# Patient Record
Sex: MALE | Race: BLACK OR AFRICAN AMERICAN | NOT HISPANIC OR LATINO | Employment: FULL TIME | URBAN - METROPOLITAN AREA
[De-identification: names, ages, dates, MRNs, and addresses within clinical notes are randomized per-mention and may not be internally consistent; named-entity substitution may affect disease eponyms.]

---

## 2023-08-08 ENCOUNTER — OFFICE VISIT (OUTPATIENT)
Dept: PRIMARY CARE | Facility: CLINIC | Age: 33
End: 2023-08-08
Payer: COMMERCIAL

## 2023-08-08 VITALS
RESPIRATION RATE: 18 BRPM | TEMPERATURE: 97.7 F | OXYGEN SATURATION: 98 % | DIASTOLIC BLOOD PRESSURE: 82 MMHG | HEIGHT: 73 IN | BODY MASS INDEX: 31.94 KG/M2 | SYSTOLIC BLOOD PRESSURE: 126 MMHG | WEIGHT: 241 LBS | HEART RATE: 70 BPM

## 2023-08-08 DIAGNOSIS — R06.83 LOUD SNORING: ICD-10-CM

## 2023-08-08 DIAGNOSIS — K14.8 LARGE TONGUE: ICD-10-CM

## 2023-08-08 DIAGNOSIS — R00.2 HEART PALPITATIONS: ICD-10-CM

## 2023-08-08 DIAGNOSIS — Z00.00 HEALTH CARE MAINTENANCE: Primary | ICD-10-CM

## 2023-08-08 DIAGNOSIS — R20.2 PARESTHESIA: ICD-10-CM

## 2023-08-08 DIAGNOSIS — G47.19 EXCESSIVE DAYTIME SLEEPINESS: ICD-10-CM

## 2023-08-08 DIAGNOSIS — I49.8 SINUS ARRHYTHMIA SEEN ON ELECTROCARDIOGRAM: ICD-10-CM

## 2023-08-08 PROBLEM — M54.12 CERVICAL RADICULOPATHY: Status: ACTIVE | Noted: 2023-08-08

## 2023-08-08 PROCEDURE — 90715 TDAP VACCINE 7 YRS/> IM: CPT | Performed by: FAMILY MEDICINE

## 2023-08-08 PROCEDURE — 90471 IMMUNIZATION ADMIN: CPT | Performed by: FAMILY MEDICINE

## 2023-08-08 PROCEDURE — 1036F TOBACCO NON-USER: CPT | Performed by: FAMILY MEDICINE

## 2023-08-08 PROCEDURE — 93000 ELECTROCARDIOGRAM COMPLETE: CPT | Performed by: FAMILY MEDICINE

## 2023-08-08 PROCEDURE — 99395 PREV VISIT EST AGE 18-39: CPT | Performed by: FAMILY MEDICINE

## 2023-08-08 ASSESSMENT — ENCOUNTER SYMPTOMS
HEADACHES: 0
OCCASIONAL FEELINGS OF UNSTEADINESS: 0
DEPRESSION: 0
SHORTNESS OF BREATH: 0
LOSS OF SENSATION IN FEET: 0

## 2023-08-08 ASSESSMENT — PATIENT HEALTH QUESTIONNAIRE - PHQ9
1. LITTLE INTEREST OR PLEASURE IN DOING THINGS: NOT AT ALL
2. FEELING DOWN, DEPRESSED OR HOPELESS: SEVERAL DAYS
10. IF YOU CHECKED OFF ANY PROBLEMS, HOW DIFFICULT HAVE THESE PROBLEMS MADE IT FOR YOU TO DO YOUR WORK, TAKE CARE OF THINGS AT HOME, OR GET ALONG WITH OTHER PEOPLE: SOMEWHAT DIFFICULT
SUM OF ALL RESPONSES TO PHQ9 QUESTIONS 1 AND 2: 1

## 2023-08-08 NOTE — PROGRESS NOTES
"Subjective     Didier River is a 32 y.o. male who presents for Annual Exam.    HPI     Pt is new to me.    He is here for well exam and to establish care.     He sees  neurology for hx of cervical radiculopathy, paresthesia.      He does have snoring and daytime sleepiness.  He does report having a sleep study remotely which as normal but he does admit to some weight gain since last time he had it checked.     Review of Systems   Respiratory:  Negative for shortness of breath.    Cardiovascular:  Negative for chest pain.   Neurological:  Negative for headaches.       Objective     Vitals:    08/08/23 0907   BP: 126/82   BP Location: Left arm   Patient Position: Sitting   Pulse: 70   Resp: 18   Temp: 36.5 °C (97.7 °F)   TempSrc: Temporal   SpO2: 98%   Weight: 109 kg (241 lb)   Height: 1.854 m (6' 1\")        No current outpatient medications     Past Surgical History:   Procedure Laterality Date    WISDOM TOOTH EXTRACTION          Social History     Tobacco Use    Smoking status: Never    Smokeless tobacco: Never   Vaping Use    Vaping Use: Never used   Substance Use Topics    Alcohol use: Not Currently    Drug use: Never        Family History   Problem Relation Name Age of Onset    Sarcoidosis Mother      Autism spectrum disorder Brother          Immunization History   Administered Date(s) Administered    Moderna SARS-CoV-2 Vaccination 04/15/2021, 05/13/2021, 02/07/2022    Tdap vaccine, age 10 years and older (BOOSTRIX) 08/08/2023        Physical Exam  Vitals reviewed.   Constitutional:       General: He is not in acute distress.     Appearance: Normal appearance. He is obese.   HENT:      Head: Normocephalic and atraumatic.      Right Ear: Tympanic membrane, ear canal and external ear normal.      Left Ear: Tympanic membrane, ear canal and external ear normal.      Nose: Nose normal.      Mouth/Throat:      Mouth: Mucous membranes are moist.      Pharynx: Oropharynx is clear. No oropharyngeal exudate or posterior " oropharyngeal erythema.      Comments: Large tongue  Eyes:      Extraocular Movements: Extraocular movements intact.      Pupils: Pupils are equal, round, and reactive to light.   Neck:      Thyroid: No thyroid mass or thyromegaly.   Cardiovascular:      Rate and Rhythm: Normal rate and regular rhythm.      Heart sounds: No murmur heard.  Pulmonary:      Effort: Pulmonary effort is normal. No respiratory distress.      Breath sounds: Normal breath sounds. No wheezing, rhonchi or rales.   Abdominal:      General: Abdomen is flat. There is no distension.      Palpations: Abdomen is soft.      Tenderness: There is no abdominal tenderness.   Genitourinary:     Testes: Normal.   Musculoskeletal:         General: Normal range of motion.   Lymphadenopathy:      Cervical: No cervical adenopathy.   Skin:     General: Skin is warm and dry.      Findings: No rash.   Neurological:      General: No focal deficit present.      Mental Status: He is alert and oriented to person, place, and time. Mental status is at baseline.   Psychiatric:         Mood and Affect: Mood normal.         Behavior: Behavior normal.         Problem List Items Addressed This Visit       Paresthesia    Relevant Orders    Vitamin B12     Other Visit Diagnoses       Health care maintenance    -  Primary    Relevant Orders    ECG 12 lead (Completed)    Comprehensive Metabolic Panel    Lipid Panel    CBC and Auto Differential    Hemoglobin A1C    Tdap vaccine, age 10 years and older (BOOSTRIX) (Completed)    Heart palpitations        Relevant Orders    Referral to Cardiology    TSH with reflex to Free T4 if abnormal    Sinus arrhythmia seen on electrocardiogram        Relevant Orders    Referral to Cardiology    Excessive daytime sleepiness        Relevant Orders    Home sleep apnea test (HSAT)    Loud snoring        Relevant Orders    Home sleep apnea test (HSAT)    Large tongue        Relevant Orders    Home sleep apnea test (HSAT)             Assessment/Plan     Well Exam - new patient    Vaccines - tdap given today    I recommend yearly flu vaccine and routine COVID vaccinations as indicated     Complete labs    Healthy diet, routine exercise was discussed with patient      Hx of cervical radiculopathy, paresthesia - sees  neurology    Heart palpitations symptoms - EKG today shows sinus rhythm with sinus arrhythmia - referred to cardio    Daytime sleepiness, loud snoring, obese - home sleep study ordered     Obesity - bmi 31, discussed trying to lose weight, healthy diet, exercise.    Follow up in 3 months or sooner if needed

## 2023-08-24 LAB
ANION GAP IN SER/PLAS: 12 MMOL/L (ref 10–20)
CALCIUM (MG/DL) IN SER/PLAS: 9.6 MG/DL (ref 8.6–10.3)
CARBON DIOXIDE, TOTAL (MMOL/L) IN SER/PLAS: 24 MMOL/L (ref 21–32)
CHLORIDE (MMOL/L) IN SER/PLAS: 106 MMOL/L (ref 98–107)
CREATININE (MG/DL) IN SER/PLAS: 0.9 MG/DL (ref 0.5–1.3)
ERYTHROCYTE DISTRIBUTION WIDTH (RATIO) BY AUTOMATED COUNT: 13.4 % (ref 11.5–14.5)
ERYTHROCYTE MEAN CORPUSCULAR HEMOGLOBIN CONCENTRATION (G/DL) BY AUTOMATED: 32.6 G/DL (ref 32–36)
ERYTHROCYTE MEAN CORPUSCULAR VOLUME (FL) BY AUTOMATED COUNT: 85 FL (ref 80–100)
ERYTHROCYTES (10*6/UL) IN BLOOD BY AUTOMATED COUNT: 4.83 X10E12/L (ref 4.5–5.9)
GFR MALE: >90 ML/MIN/1.73M2
GLUCOSE (MG/DL) IN SER/PLAS: 72 MG/DL (ref 74–99)
HEMATOCRIT (%) IN BLOOD BY AUTOMATED COUNT: 41.1 % (ref 41–52)
HEMOGLOBIN (G/DL) IN BLOOD: 13.4 G/DL (ref 13.5–17.5)
LEUKOCYTES (10*3/UL) IN BLOOD BY AUTOMATED COUNT: 6.8 X10E9/L (ref 4.4–11.3)
MAGNESIUM (MG/DL) IN SER/PLAS: 2.01 MG/DL (ref 1.6–2.4)
NRBC (PER 100 WBCS) BY AUTOMATED COUNT: 0 /100 WBC (ref 0–0)
PLATELETS (10*3/UL) IN BLOOD AUTOMATED COUNT: 252 X10E9/L (ref 150–450)
POTASSIUM (MMOL/L) IN SER/PLAS: 3.6 MMOL/L (ref 3.5–5.3)
SODIUM (MMOL/L) IN SER/PLAS: 138 MMOL/L (ref 136–145)
THYROTROPIN (MIU/L) IN SER/PLAS BY DETECTION LIMIT <= 0.05 MIU/L: 0.62 MIU/L (ref 0.44–3.98)
UREA NITROGEN (MG/DL) IN SER/PLAS: 12 MG/DL (ref 6–23)

## 2023-09-25 DIAGNOSIS — R06.83 LOUD SNORING: ICD-10-CM

## 2023-09-25 DIAGNOSIS — K14.8 LARGE TONGUE: ICD-10-CM

## 2023-09-25 DIAGNOSIS — G47.19 EXCESSIVE DAYTIME SLEEPINESS: ICD-10-CM

## 2023-09-26 PROBLEM — G47.33 OBSTRUCTIVE SLEEP APNEA: Status: ACTIVE | Noted: 2023-09-26

## 2023-10-05 ENCOUNTER — OFFICE VISIT (OUTPATIENT)
Dept: PRIMARY CARE | Facility: CLINIC | Age: 33
End: 2023-10-05
Payer: COMMERCIAL

## 2023-10-05 VITALS
HEART RATE: 66 BPM | DIASTOLIC BLOOD PRESSURE: 68 MMHG | TEMPERATURE: 97.6 F | BODY MASS INDEX: 31.94 KG/M2 | OXYGEN SATURATION: 98 % | SYSTOLIC BLOOD PRESSURE: 115 MMHG | RESPIRATION RATE: 18 BRPM | WEIGHT: 241 LBS | HEIGHT: 73 IN

## 2023-10-05 DIAGNOSIS — G47.33 OBSTRUCTIVE SLEEP APNEA: Primary | ICD-10-CM

## 2023-10-05 DIAGNOSIS — F32.A ANXIETY AND DEPRESSION: ICD-10-CM

## 2023-10-05 DIAGNOSIS — F41.9 ANXIETY AND DEPRESSION: ICD-10-CM

## 2023-10-05 PROCEDURE — 1036F TOBACCO NON-USER: CPT | Performed by: FAMILY MEDICINE

## 2023-10-05 PROCEDURE — 99214 OFFICE O/P EST MOD 30 MIN: CPT | Performed by: FAMILY MEDICINE

## 2023-10-05 RX ORDER — CITALOPRAM 10 MG/1
2 TABLET ORAL DAILY
COMMUNITY
Start: 2023-08-15

## 2023-10-05 RX ORDER — MULTIVITAMIN
TABLET ORAL
COMMUNITY
Start: 2023-09-18

## 2023-10-05 RX ORDER — CITALOPRAM 20 MG/1
30 TABLET, FILM COATED ORAL DAILY
COMMUNITY
Start: 2023-09-14

## 2023-10-05 ASSESSMENT — ENCOUNTER SYMPTOMS
SHORTNESS OF BREATH: 0
HEADACHES: 0

## 2023-10-05 NOTE — PROGRESS NOTES
"Subjective     Didier River is a 32 y.o. male who presents for Sleep Apnea.    HPI     Pt is here to discuss his recent home sleep study through  which showed mild ENID.  He continues to have daytime sleepiness, snoring, some nighttime awakenings.  No morning headaches or choking or gasping.  He is interested in getting started on CPAP therapy.  He has an 18 inch neck size.      He stopped drinking caffeine about one month ago.      Review of Systems   Respiratory:  Negative for shortness of breath.    Cardiovascular:  Negative for chest pain.   Neurological:  Negative for headaches.       Objective     Vitals:    10/05/23 1546   BP: 115/68   BP Location: Left arm   Patient Position: Sitting   Pulse: 66   Resp: 18   Temp: 36.4 °C (97.6 °F)   TempSrc: Temporal   SpO2: 98%   Weight: 109 kg (241 lb)   Height: 1.854 m (6' 1\")        Current Outpatient Medications   Medication Instructions    citalopram (CeleXA) 10 mg tablet 2 tablets, oral, Daily    citalopram (CELEXA) 30 mg, oral, Daily, Take 1 and 1/2 (one and one-half) tablets by mouth daily.    multivitamin tablet oral        Past Surgical History:   Procedure Laterality Date    WISDOM TOOTH EXTRACTION          Social History     Tobacco Use    Smoking status: Never    Smokeless tobacco: Never   Vaping Use    Vaping Use: Never used   Substance Use Topics    Alcohol use: Not Currently    Drug use: Never        Family History   Problem Relation Name Age of Onset    Sarcoidosis Mother      Autism spectrum disorder Brother          Immunization History   Administered Date(s) Administered    Moderna SARS-CoV-2 Vaccination 04/15/2021, 05/13/2021, 02/07/2022    Tdap vaccine, age 7 year and older (BOOSTRIX) 08/08/2023        Physical Exam  Vitals reviewed.   Constitutional:       General: He is not in acute distress.     Appearance: Normal appearance. He is well-developed. He is obese.   HENT:      Head: Normocephalic and atraumatic.      Mouth/Throat:      Pharynx: No " Status post laparoscopic right inguinal hernia repair    Subjective:  Doing well. Bowel function normal. Pain resolved. Denies nausea or vomiting. The patient had no seroma or hematoma postoperatively.      Objective:    Vitals:    05/18/17 1430   BP: 110/70   Pulse: 74   Resp: 16   Temp: 98.3 °F (36.8 °C)       Exam:  General: alert and oriented, no acute distress  Pulmonary: clear to auscultation bilaterally  Cardiac: regular rate and rhythm  Abdomen: soft, non-distended, non-tender  Incisions: clean and dry, there is no hernia present    Assessment:    s/p laparoscopic right inguinal hernia repair    Plan:  1. Resume activity as tolerated. Return as needed.       oropharyngeal exudate or posterior oropharyngeal erythema.      Comments: Large tongue, small airway noted.   Eyes:      General: Lids are normal.      Conjunctiva/sclera:      Right eye: Right conjunctiva is not injected.      Left eye: Left conjunctiva is not injected.   Neck:      Comments: 18 inch neck size   Cardiovascular:      Rate and Rhythm: Normal rate and regular rhythm.      Heart sounds: No murmur heard.  Pulmonary:      Effort: Pulmonary effort is normal. No respiratory distress.      Breath sounds: Normal breath sounds. No wheezing, rhonchi or rales.   Skin:     General: Skin is warm and dry.      Findings: No rash.   Neurological:      Mental Status: He is alert and oriented to person, place, and time. Mental status is at baseline.   Psychiatric:         Mood and Affect: Mood normal.         Behavior: Behavior normal.         Problem List Items Addressed This Visit       Obstructive sleep apnea - Primary    Relevant Orders    Positive Airway Pressure (PAP) Therapy    Anxiety and depression       Assessment/Plan     ENID - mild - recently diagnosed 9/2023 via  home sleep study.  Auto cpap ordered, follow up with me 4-6 weeks after you start cpap therapy.      Depression - sees psychiatry, on celexa 30 mg daily    Follow up after 1 month of CPAP use.

## 2023-11-09 ENCOUNTER — TELEPHONE (OUTPATIENT)
Dept: PRIMARY CARE | Facility: CLINIC | Age: 33
End: 2023-11-09
Payer: COMMERCIAL

## 2023-11-09 NOTE — TELEPHONE ENCOUNTER
Pt called and stated that the C-PAP machine that he was suppose to hear about he has not heard anything.  He said that Dr. VALDOVINOS was going to put in an order when he was here on 10/5/23 and said he would hear from them in about a week.  He would like to know what step he should take next.  Please advise.

## 2023-11-30 ENCOUNTER — TELEPHONE (OUTPATIENT)
Dept: PRIMARY CARE | Facility: CLINIC | Age: 33
End: 2023-11-30
Payer: COMMERCIAL

## 2023-11-30 NOTE — TELEPHONE ENCOUNTER
Medical services company called and needs office notes prior to the sleep study.  They have the notes from 10/5/23 they need notes prior to 9/6/23.   Please fax [phone]

## 2024-01-02 ENCOUNTER — APPOINTMENT (OUTPATIENT)
Dept: PRIMARY CARE | Facility: CLINIC | Age: 34
End: 2024-01-02
Payer: COMMERCIAL

## 2024-01-19 ENCOUNTER — TELEPHONE (OUTPATIENT)
Dept: PRIMARY CARE | Facility: CLINIC | Age: 34
End: 2024-01-19
Payer: COMMERCIAL

## 2024-01-22 ENCOUNTER — OFFICE VISIT (OUTPATIENT)
Dept: PRIMARY CARE | Facility: CLINIC | Age: 34
End: 2024-01-22
Payer: COMMERCIAL

## 2024-01-22 VITALS
DIASTOLIC BLOOD PRESSURE: 79 MMHG | BODY MASS INDEX: 33.13 KG/M2 | OXYGEN SATURATION: 98 % | HEIGHT: 73 IN | SYSTOLIC BLOOD PRESSURE: 128 MMHG | RESPIRATION RATE: 18 BRPM | HEART RATE: 76 BPM | WEIGHT: 250 LBS | TEMPERATURE: 97.6 F

## 2024-01-22 DIAGNOSIS — G47.33 OBSTRUCTIVE SLEEP APNEA: ICD-10-CM

## 2024-01-22 DIAGNOSIS — L81.9 HYPERPIGMENTED SKIN LESION: Primary | ICD-10-CM

## 2024-01-22 PROCEDURE — 1036F TOBACCO NON-USER: CPT | Performed by: FAMILY MEDICINE

## 2024-01-22 PROCEDURE — 99213 OFFICE O/P EST LOW 20 MIN: CPT | Performed by: FAMILY MEDICINE

## 2024-01-22 ASSESSMENT — ENCOUNTER SYMPTOMS
SHORTNESS OF BREATH: 0
HEADACHES: 0

## 2024-01-22 NOTE — PROGRESS NOTES
"Subjective     Didier River is a 33 y.o. male who presents for Skin Problem.    HPI     Pt has noticed a few new moles on right hip and left hip a few months ago.  No change in size or shape.  Dark brown/black moles.  No family hx of skin cancer.     Review of Systems   Respiratory:  Negative for shortness of breath.    Cardiovascular:  Negative for chest pain.   Neurological:  Negative for headaches.       Objective     Vitals:    01/22/24 1120   BP: 128/79   BP Location: Left arm   Patient Position: Sitting   Pulse: 76   Resp: 18   Temp: 36.4 °C (97.6 °F)   TempSrc: Temporal   SpO2: 98%   Weight: 113 kg (250 lb)   Height: 1.854 m (6' 1\")        Current Outpatient Medications   Medication Instructions    citalopram (CeleXA) 10 mg tablet 2 tablets, oral, Daily    citalopram (CELEXA) 30 mg, oral, Daily, Take 1 and 1/2 (one and one-half) tablets by mouth daily.    multivitamin tablet oral        Past Surgical History:   Procedure Laterality Date    WISDOM TOOTH EXTRACTION          Social History     Tobacco Use    Smoking status: Never    Smokeless tobacco: Never   Vaping Use    Vaping Use: Never used   Substance Use Topics    Alcohol use: Not Currently    Drug use: Never        Family History   Problem Relation Name Age of Onset    Sarcoidosis Mother      Autism spectrum disorder Brother          Immunization History   Administered Date(s) Administered    Moderna SARS-CoV-2 Vaccination 04/15/2021, 05/13/2021, 02/07/2022    Tdap vaccine, age 7 year and older (BOOSTRIX) 08/08/2023        Physical Exam  Vitals reviewed.   Constitutional:       General: He is not in acute distress.     Appearance: Normal appearance. He is well-developed.   HENT:      Head: Normocephalic and atraumatic.   Eyes:      General: Lids are normal.      Conjunctiva/sclera:      Right eye: Right conjunctiva is not injected.      Left eye: Left conjunctiva is not injected.   Cardiovascular:      Rate and Rhythm: Normal rate and regular rhythm.      " Heart sounds: No murmur heard.  Pulmonary:      Effort: Pulmonary effort is normal. No respiratory distress.      Breath sounds: Normal breath sounds. No wheezing, rhonchi or rales.   Skin:     General: Skin is warm and dry.      Findings: No rash.   Neurological:      Mental Status: He is alert and oriented to person, place, and time. Mental status is at baseline.   Psychiatric:         Mood and Affect: Mood normal.         Behavior: Behavior normal.         Problem List Items Addressed This Visit       Obstructive sleep apnea     Other Visit Diagnoses       Hyperpigmented skin lesion    -  Primary    Relevant Orders    Referral to Dermatology            Assessment/Plan     Hyperpigmented skin lesion on bilateral hips - will refer to derm for further evaluation.     ENID - has not yet started a cpap machine.  The medical supply company (medical service company) has not yet delivered him a auto cpap yet.  I will check into the status of this order.      Follow up after 1 month of cpap device use

## 2024-04-22 ENCOUNTER — TELEPHONE (OUTPATIENT)
Dept: PRIMARY CARE | Facility: CLINIC | Age: 34
End: 2024-04-22
Payer: COMMERCIAL

## 2024-04-22 NOTE — TELEPHONE ENCOUNTER
Pt called stating he spoke with his insurance company and was told that he is able to get a sleep apnea machine. He is not sure where to go from here. Please advise patient.

## 2024-06-19 ENCOUNTER — APPOINTMENT (OUTPATIENT)
Dept: DERMATOLOGY | Facility: CLINIC | Age: 34
End: 2024-06-19
Payer: COMMERCIAL

## 2024-06-19 DIAGNOSIS — L73.9 FOLLICULITIS: Primary | ICD-10-CM

## 2024-06-19 PROCEDURE — 99204 OFFICE O/P NEW MOD 45 MIN: CPT | Performed by: DERMATOLOGY

## 2024-06-19 RX ORDER — CLINDAMYCIN PHOSPHATE 10 MG/G
GEL TOPICAL 2 TIMES DAILY
Qty: 60 G | Refills: 11 | Status: SHIPPED | OUTPATIENT
Start: 2024-06-19 | End: 2025-06-19

## 2024-06-19 NOTE — PROGRESS NOTES
Subjective   Didier River is a 33 y.o. male who presents for the following: Suspicious Skin Lesion (New).    PATIENT REFERRAL:  Made by Didier Pantoja DO, who last seen this patient on 01/22/24, recommended that Dermatology see patient for hyperpigmented skin lesion.     Skin Lesion  He describes it as a bump, that is located on his  right lateral buttocks .  It was first noticed 7 months ago. It has been causing no skin symptoms and is not changing. Previously this spot has never been  treated .  Other spots that are concerning: none    Family History of Skin Cancer(s):  none  Personal History of Skin Cancer(s):  none  Family History of Skin Disorder(s):  none  Personal History of Skin Disorder(s):  none       Objective   Well appearing patient in no apparent distress; mood and affect are within normal limits.    A focused examination was performed including R buttock. All findings within normal limits unless otherwise noted below.    Right Buttock  Follicularly-based erythematous papules and pustules.      Assessment/Plan   Folliculitis  Right Buttock    The nature of the diagnosis was explained to patient. Will plan to treat with clindamycin gel BID x 2 weeks then PRN for flares. Pt to call if worsening/not responding. Risks, benefits, side effects, alternatives and options were discussed with patient and the patient voiced understanding. Pt agrees with plan as above.       Related Medications  clindamycin (Cleocin T) 1 % gel  Apply topically 2 times a day. For 1-2 weeks then as needed for flares      Follow up as needed.

## 2024-09-13 PROBLEM — R07.89 CHEST DISCOMFORT: Status: ACTIVE | Noted: 2024-09-13

## 2024-09-13 PROBLEM — E66.9 OBESITY (BMI 30-39.9): Status: ACTIVE | Noted: 2024-09-13

## 2024-09-13 PROBLEM — R00.2 PALPITATIONS: Status: ACTIVE | Noted: 2024-09-13

## 2024-09-13 PROBLEM — I47.10 SVT (SUPRAVENTRICULAR TACHYCARDIA) (CMS-HCC): Status: ACTIVE | Noted: 2024-09-13

## 2024-09-13 NOTE — PROGRESS NOTES
[facility]    Patient Name: Didier River  Patient : 1990      Scribe Attestation  By signing my name below, IMacrina Scribe   attest that this documentation has been prepared under the direction and in the presence of Ruddy Chakraborty DO.      Reason for visit:  This is a 33-year-old male here for follow-up regarding palpitations/SVT, chest discomfort, and obesity.     HPI:  This is a 33-year-old male here for follow-up regarding palpitations/SVT, chest discomfort, and obesity.  The patient was last evaluated by me in 2023.  At that visit I discussed possible repeat stress testing versus coronary CTA which the patient declined, discussed beta-blocker therapy which the patient declined, and asked the patient to follow-up in 1 year.  Stress test done in 2023 was nondiagnostic as ECG leads fell off during peak exercise, no ischemic changes noted on ECG tracings that were obtained. ECG done today showed sinus rhythm with a heart rate of 87 bpm. The patient reports that he has been feeling generally well from the cardiac standpoint. He denies any new chest pain, shortness of breath, palpitations and lightheadedness. During my exam, he was resting comfortably on the exam table.            Assessment/Plan:   1. Palpitations/SVT  The patient has a history of SVT seen on cardiac monitor.  ECG done today showed sinus rhythm with a heart rate of 87 bpm.    He denies chest pain, palpitations or lightheadedness.   Echocardiogram done in 2022 showed normal left ventricular systolic function with an ejection fraction of 55 to 60%, normal right ventricular systolic function, no significant valve abnormalities.   Patient should follow general recommendations including avoiding excessive alcohol intake, avoiding excessive caffeine intake, staying well-hydrated, getting an appropriate amount of sleep.  Follow up in 1 year and sooner if necessary.      2.  Chest discomfort  The previously reported chest discomfort had resolved.  ECG done today showed sinus rhythm with a heart rate of 87 bpm.   She denies anginal chest discomfort or shortness of breath on exertion.  Blood pressure appears controlled on exam today.  Echocardiogram as noted above.  Stress test done in August 2023 was nondiagnostic as ECG leads fell off during peak exercise, no ischemic changes noted on ECG tracings that were obtained.  Continue risk factor modification.     3. Obesity  Please see lifestyle recommendations below.       Orders:   Follow-up in 1 year    Lifestyle Recommendations  I recommend a whole-food plant-based diet, an eating pattern that encourages the consumption of unrefined plant foods (such as fruits, vegetables, tubers, whole grains, legumes, nuts and seeds) and discourages meats, dairy products, eggs and processed foods.     The AHA/ACC recommends that the patient consume a dietary pattern that emphasizes intake of vegetables, fruits, and whole grains; includes low-fat dairy products, poultry, fish, legumes, non-tropical vegetable oils, and nuts; and limits intake of sodium, sweets, sugar-sweetened beverages, and red meats.  Adapt this dietary pattern to appropriate calorie requirements (a 500-750 kcal/day deficit to loose weight), personal and cultural food preferences, and nutrition therapy for other medical conditions (including diabetes).  Achieve this pattern by following plans such as the [place] Mediterranean, DASH dietary pattern, or AHA diet.     Engage in 2 hours and 30 minutes per week of moderate-intensity physical activity, or 1 hour and 15 minutes (75 minutes) per week of vigorous-intensity aerobic physical activity, or an equivalent combination of moderate and vigorous-intensity aerobic physical activity. Aerobic activity should be performed in episodes of at least 10 minutes preferably spread throughout the week.     Adhering to a heart healthy diet, regular  exercise habits, avoidance of tobacco products, and maintenance of a healthy weight are crucial components of their heart disease risk reduction.     Any positive review of systems not specifically addressed in the office visit today should be evaluated and treated by the patients primary care physician or in an emergency department if necessary     Patient was notified that results from ordered tests will be called to the patient if it changes current management; it will otherwise be discussed at a future appointment and available on [facility].     Thank you for allowing me to participate in the care of this patient.        This document was generated using the assistance of voice recognition software. If there are any errors of spelling, grammar, syntax, or meaning; please feel free to contact me directly for clarification.    Past Medical History:  He has a past medical history of Personal history of other infectious and parasitic diseases and Personal history of other mental and behavioral disorders.    Past Surgical History:  He has a past surgical history that includes [place] tooth extraction.      Social History:  He reports that he has never smoked. He has never used smokeless tobacco. He reports that he does not currently use alcohol. He reports that he does not use drugs.    Family History:  Family History   Problem Relation Name Age of Onset    Sarcoidosis Mother      Autism spectrum disorder Brother          Allergies:  Patient has no known allergies.    Outpatient Medications:  Current Outpatient Medications   Medication Instructions    citalopram (CeleXA) 10 mg tablet 2 tablets, oral, Daily    citalopram (CELEXA) 30 mg, oral, Daily, Take 1 and 1/2 (one and one-half) tablets by mouth daily.    clindamycin (Cleocin T) 1 % gel Topical, 2 times daily, For 1-2 weeks then as needed for flares    multivitamin tablet oral        ROS:  A 14 point review of systems was done and is negative other than as stated  "in HPI    Vitals:      10/31/2022    11:31 AM 7/13/2023     2:01 PM 8/8/2023     9:07 AM 8/15/2023     3:11 PM 9/18/2023     3:38 PM 10/5/2023     3:46 PM 1/22/2024    11:20 AM   Vitals   Systolic 128 137 126 118 118 115 128   Diastolic 70 85 82 70 72 68 79   Heart Rate 76 63 70 74 74 66 76   Temp 36.2 °C (97.1 °F) 36 °C (96.8 °F) 36.5 °C (97.7 °F)   36.4 °C (97.6 °F) 36.4 °C (97.6 °F)   Resp   18   18 18   Height (in) 1.829 m (6') 1.854 m (6' 1\") 1.854 m (6' 1\") 1.854 m (6' 1\") 1.854 m (6' 1\") 1.854 m (6' 1\") 1.854 m (6' 1\")   Weight (lb) 252 243.31 241 229.38 238.25 241 250   BMI 34.18 kg/m2 32.1 kg/m2 31.8 kg/m2 30.26 kg/m2 31.43 kg/m2 31.8 kg/m2 32.98 kg/m2   BSA (m2) 2.41 m2 2.38 m2 2.37 m2 2.31 m2 2.36 m2 2.37 m2 2.41 m2        Physical Exam:   Constitutional: Cooperative, in no acute distress, alert, appears stated age.   Skin: Skin color, texture, turgor normal. No rashes or lesions.   Head: Normocephalic. No masses, lesions, tenderness or abnormalities   Eyes: Extraocular movements are grossly intact.   Mouth and throat: Mucous membranes moist   Neck: Neck supple, no carotid bruits, no JVD   Respiratory: Lungs clear to auscultation, no wheezing or rhonchi, no use of accessory muscles   Chest wall: No scars, normal excursion with respiration   Cardiovascular: Regular rhythm without murmur, gallop, or rubs   Gastrointestinal: Abdomen soft, nontender. Bowel sounds normal. Obese.  Musculoskeletal: Strength equal in upper extremities   Extremities: No pitting edema   Neurologic: Sensation grossly intact, alert and oriented x3     Intake/Output:   No intake/output data recorded.    Outpatient Medications  Current Outpatient Medications on File Prior to Visit   Medication Sig Dispense Refill    citalopram (CeleXA) 10 mg tablet Take 2 tablets (20 mg) by mouth once daily.      citalopram (CeleXA) 20 mg tablet Take 1.5 tablets (30 mg) by mouth once daily. Take 1 and 1/2 (one and one-half) tablets by mouth daily.      " clindamycin (Cleocin T) 1 % gel Apply topically 2 times a day. For 1-2 weeks then as needed for flares 60 g 11    multivitamin tablet Take by mouth.       No current facility-administered medications on file prior to visit.       Labs: (past 26 weeks)  No results found for this or any previous visit (from the past 4368 hour(s)).    ECG  No results found for this or any previous visit (from the past 4464 hour(s)).    Echocardiogram  No results found for this or any previous visit from the past 1095 days.      CV Studies:  EKG:No results found for this or any previous visit (from the past 4464 hour(s)).  Echocardiogram:   ECHOCARDIOGRAM     Narrative  Ordered by an unspecified provider.    Stress Testing IMGRESULT([id number]:[id number]): No results found for this or any previous visit from the past 1825 days.    Cardiac Catheterization: No results found for this or any previous visit from the past 1825 days.  No results found for this or any previous visit from the past 3650 days.     Cardiac Scoring: No results found for this or any previous visit from the past 1825 days.    AAA : No results found for this or any previous visit from the past 1825 days.    OTHER: No results found for this or any previous visit from the past 1825 days.    LAST IMAGING RESULTS  ELECTROCARDIOGRAM RHYTHM STRIP  Ordered by an unspecified provider.    Problem List Items Addressed This Visit       Palpitations - Primary    SVT (supraventricular tachycardia) (CMS-[facility])    Chest discomfort    Obesity (BMI 30-39.9)        Ruddy Chakraborty DO, JAYLAN, FACOI

## 2024-09-18 ENCOUNTER — APPOINTMENT (OUTPATIENT)
Dept: CARDIOLOGY | Facility: CLINIC | Age: 34
End: 2024-09-18
Payer: COMMERCIAL

## 2024-09-18 VITALS
BODY MASS INDEX: 35.73 KG/M2 | SYSTOLIC BLOOD PRESSURE: 130 MMHG | DIASTOLIC BLOOD PRESSURE: 76 MMHG | HEIGHT: 73 IN | HEART RATE: 87 BPM | WEIGHT: 269.6 LBS

## 2024-09-18 DIAGNOSIS — I47.10 SVT (SUPRAVENTRICULAR TACHYCARDIA) (CMS-HCC): ICD-10-CM

## 2024-09-18 DIAGNOSIS — R07.89 CHEST DISCOMFORT: ICD-10-CM

## 2024-09-18 DIAGNOSIS — E66.9 OBESITY (BMI 30-39.9): ICD-10-CM

## 2024-09-18 DIAGNOSIS — R00.2 PALPITATIONS: Primary | ICD-10-CM

## 2024-09-18 PROCEDURE — 99213 OFFICE O/P EST LOW 20 MIN: CPT | Performed by: INTERNAL MEDICINE

## 2024-09-18 PROCEDURE — 93005 ELECTROCARDIOGRAM TRACING: CPT | Performed by: INTERNAL MEDICINE

## 2024-09-18 PROCEDURE — 3008F BODY MASS INDEX DOCD: CPT | Performed by: INTERNAL MEDICINE

## 2024-09-18 PROCEDURE — 93010 ELECTROCARDIOGRAM REPORT: CPT | Performed by: INTERNAL MEDICINE

## 2024-09-18 PROCEDURE — 1036F TOBACCO NON-USER: CPT | Performed by: INTERNAL MEDICINE

## 2025-06-14 ENCOUNTER — OFFICE VISIT (OUTPATIENT)
Dept: URGENT CARE | Age: 35
End: 2025-06-14
Payer: COMMERCIAL

## 2025-06-14 VITALS
WEIGHT: 275 LBS | OXYGEN SATURATION: 97 % | HEIGHT: 73 IN | RESPIRATION RATE: 16 BRPM | DIASTOLIC BLOOD PRESSURE: 56 MMHG | HEART RATE: 84 BPM | SYSTOLIC BLOOD PRESSURE: 121 MMHG | TEMPERATURE: 99.1 F | BODY MASS INDEX: 36.45 KG/M2

## 2025-06-14 DIAGNOSIS — J01.00 ACUTE NON-RECURRENT MAXILLARY SINUSITIS: Primary | ICD-10-CM

## 2025-06-14 RX ORDER — AMOXICILLIN AND CLAVULANATE POTASSIUM 875; 125 MG/1; MG/1
875 TABLET, FILM COATED ORAL 2 TIMES DAILY
Qty: 20 TABLET | Refills: 0 | Status: SHIPPED | OUTPATIENT
Start: 2025-06-14

## 2025-06-14 ASSESSMENT — PATIENT HEALTH QUESTIONNAIRE - PHQ9
SUM OF ALL RESPONSES TO PHQ9 QUESTIONS 1 AND 2: 0
1. LITTLE INTEREST OR PLEASURE IN DOING THINGS: NOT AT ALL
2. FEELING DOWN, DEPRESSED OR HOPELESS: NOT AT ALL

## 2025-06-14 ASSESSMENT — ENCOUNTER SYMPTOMS
FACIAL ASYMMETRY: 0
NUMBNESS: 0
SEIZURES: 0
RHINORRHEA: 1
SPEECH DIFFICULTY: 0
CONSTITUTIONAL NEGATIVE: 1
SINUS PAIN: 1
SINUS PRESSURE: 1
GASTROINTESTINAL NEGATIVE: 1
FACIAL SWELLING: 0
SORE THROAT: 0
MUSCULOSKELETAL NEGATIVE: 1
PSYCHIATRIC NEGATIVE: 1
WEAKNESS: 0
LIGHT-HEADEDNESS: 0
TROUBLE SWALLOWING: 0
CARDIOVASCULAR NEGATIVE: 1
ENDOCRINE NEGATIVE: 1
TREMORS: 0
RESPIRATORY NEGATIVE: 1
DIZZINESS: 0
HEMATOLOGIC/LYMPHATIC NEGATIVE: 1

## 2025-06-14 NOTE — PROGRESS NOTES
"Subjective   Patient ID: Didier River is a 34 y.o. male. They present today with a chief complaint of Nasal Congestion (Sinus pressure. X 2.5 weeks ).    History of Present Illness  HPI  Sinus pressure for 2.5 weeks.  Teeth pain on right and left.  OTC meds no benefit.  No fever or aches.  No cough.  Eating and drinking well.   Advil no benefit.   No SOB.  No other neuro sxs. Denies headache, just pressure.   Past Medical History  Allergies as of 06/14/2025    (No Known Allergies)       Prescriptions Prior to Admission[1]     Medical History[2]    Surgical History[3]     reports that he has never smoked. He has never used smokeless tobacco. He reports that he does not currently use alcohol. He reports that he does not use drugs.    Review of Systems  Review of Systems   Constitutional: Negative.    HENT:  Positive for congestion, ear pain, rhinorrhea, sinus pressure and sinus pain. Negative for facial swelling, sore throat and trouble swallowing.    Respiratory: Negative.     Cardiovascular: Negative.    Gastrointestinal: Negative.    Endocrine: Negative.    Genitourinary: Negative.    Musculoskeletal: Negative.    Neurological:  Negative for dizziness, tremors, seizures, syncope, facial asymmetry, speech difficulty, weakness, light-headedness and numbness.   Hematological: Negative.    Psychiatric/Behavioral: Negative.                                    Objective    Vitals:    06/14/25 0836   BP: 121/56   Pulse: 84   Resp: 16   Temp: 37.3 °C (99.1 °F)   TempSrc: Oral   SpO2: 97%   Weight: 125 kg (275 lb)   Height: 1.854 m (6' 1\")     No LMP for male patient.    Physical Exam  Constitutional:       Appearance: Normal appearance.   HENT:      Head: Normocephalic and atraumatic.      Comments: Sinus pain to maxillary r and Left     Right Ear: Tympanic membrane, ear canal and external ear normal.      Left Ear: Tympanic membrane, ear canal and external ear normal.      Nose: No mucosal edema or rhinorrhea.      Right " Sinus: No maxillary sinus tenderness or frontal sinus tenderness.      Left Sinus: No maxillary sinus tenderness or frontal sinus tenderness.      Mouth/Throat:      Lips: Pink.      Mouth: Mucous membranes are moist.      Dentition: Normal dentition. No gum lesions.      Tongue: No lesions. Tongue does not deviate from midline.      Palate: No mass and lesions.      Pharynx: No pharyngeal swelling, posterior oropharyngeal erythema, uvula swelling or postnasal drip.   Cardiovascular:      Rate and Rhythm: Normal rate and regular rhythm.      Pulses: Normal pulses.      Heart sounds: Normal heart sounds.   Pulmonary:      Effort: Pulmonary effort is normal. No respiratory distress.      Breath sounds: Normal breath sounds. No stridor. No wheezing, rhonchi or rales.   Neurological:      Mental Status: He is alert.         Procedures    Point of Care Test & Imaging Results from this visit  No results found for this visit on 06/14/25.   Imaging  No results found.    Cardiology, Vascular, and Other Imaging  No other imaging results found for the past 2 days      Diagnostic study results (if any) were reviewed by Roger Woodruff PA-C.    Assessment/Plan   Allergies, medications, history, and pertinent labs/EKGs/Imaging reviewed by Roger Woodruff PA-C.     Medical Decision Making  Acute Sinusitis-?MDM:?Based on history of persistent sinus symptoms, likely bacterial sinusitis.?No evidence of Meningitis, OM, Strep or Pneumonia.?Will start on oral antibiotics today.? Encouraged patient to continue symptomatic and supportive care measures.? RTC with any new or worsening symptoms.? Patient verbalized understanding and agreeable with plan.??      Follow up with PCP next week if not be better    At time of discharge, patient was clinically well-appearing and appropriate for outpatient management. The patient/parent/guardian was educated regarding diagnosis, supportive care, OTC and Rx medications. The patient/parent/guardian  was given the opportunity to ask questions prior to discharge. They verbalized understanding of discussion of treatment plan, expected course of illness and/or injury, indications on when to return to , when to seek further evaluation in ED/call 911, and the need to follow up with PCP and/or specialist as referred. Patient/parent/guardian was provided with work/school documentation if requested. Patient stable upon discharge.      Orders and Diagnoses  Diagnoses and all orders for this visit:  Acute non-recurrent maxillary sinusitis  -     amoxicillin-clavulanate (Augmentin) 875-125 mg tablet; Take 1 tablet (875 mg of amoxicillin) by mouth 2 times a day.      Medical Admin Record      Patient disposition: Home    Electronically signed by Roger Woodruff PA-C  9:03 AM           [1] (Not in a hospital admission)   [2]   Past Medical History:  Diagnosis Date    Personal history of other infectious and parasitic diseases     History of chickenpox    Personal history of other mental and behavioral disorders     History of depression   [3]   Past Surgical History:  Procedure Laterality Date    WISDOM TOOTH EXTRACTION

## 2025-09-04 ENCOUNTER — APPOINTMENT (OUTPATIENT)
Facility: CLINIC | Age: 35
End: 2025-09-04
Payer: COMMERCIAL

## 2025-09-04 PROBLEM — E66.812 CLASS 2 OBESITY DUE TO EXCESS CALORIES WITHOUT SERIOUS COMORBIDITY WITH BODY MASS INDEX (BMI) OF 36.0 TO 36.9 IN ADULT: Status: ACTIVE | Noted: 2025-09-04

## 2025-09-04 PROBLEM — E66.09 CLASS 2 OBESITY DUE TO EXCESS CALORIES WITHOUT SERIOUS COMORBIDITY WITH BODY MASS INDEX (BMI) OF 36.0 TO 36.9 IN ADULT: Status: ACTIVE | Noted: 2025-09-04

## 2025-09-04 PROBLEM — I47.10 SVT (SUPRAVENTRICULAR TACHYCARDIA): Status: RESOLVED | Noted: 2024-09-13 | Resolved: 2025-09-04

## 2025-09-04 ASSESSMENT — PATIENT HEALTH QUESTIONNAIRE - PHQ9
2. FEELING DOWN, DEPRESSED OR HOPELESS: NOT AT ALL
1. LITTLE INTEREST OR PLEASURE IN DOING THINGS: NOT AT ALL
SUM OF ALL RESPONSES TO PHQ9 QUESTIONS 1 AND 2: 0

## 2025-09-04 ASSESSMENT — ENCOUNTER SYMPTOMS
SHORTNESS OF BREATH: 0
HEADACHES: 0

## 2025-09-19 ENCOUNTER — APPOINTMENT (OUTPATIENT)
Dept: CARDIOLOGY | Facility: HOSPITAL | Age: 35
End: 2025-09-19
Payer: COMMERCIAL